# Patient Record
Sex: FEMALE | Race: ASIAN
[De-identification: names, ages, dates, MRNs, and addresses within clinical notes are randomized per-mention and may not be internally consistent; named-entity substitution may affect disease eponyms.]

---

## 2018-09-21 NOTE — GI INITIAL CONSULT NOTE
History of Present Illness


General


Date patient seen:  Sep 20, 2018


Time patient seen:  14:41


Referring physician:  ANETTE


Reason for Consultation:  SCERENING COLON





Present Illness


HPI


53 year old female patient presents today with c/o of diarrhea with BM x4 daily 

in which she describes as bristol stool type #6.  The patient denies any other 

associated GI symptoms; denies abdominal pain, N/V or diarrhea.  Patient with 

no history.  No history of endoscopy / colonoscopy .  Denies any unintentional 

weight loss or changes in dietary habits.  No signs of abuse or neglect.  

Patient is not fall risk.


Home Meds


Reported Medications


Atenolol* (TENORMIN*) 25 Mg Tablet, ORAL DAILY, TAB


   9/20/18


Med list reviewed/reconciled:  Yes


Allergies:  


Coded Allergies:  


     No Known Allergies (Unverified , 9/20/18)





Patient History


History Provided By:  Patient, Medical Record


PMH Narrative


HTN





Denies any surgical history


Family History Narrative


Mother with DM, heart disease.


Social History:  Reports: other - caffiene.; Denies: smoking, alcohol use, drug 

use





Review of Systems


All Other Systems:  negative except mentioned in HPI





Physical Exam





Vital Signs








  Date Time  Temp Pulse Resp B/P (MAP) Pulse Ox O2 Delivery O2 Flow Rate FiO2


 


9/20/18 14:00 98.7 98  146/72 96   





 98.7       








Sp02 EP Interpretation:  reviewed, normal


General Appearance:  well appearing, no apparent distress, alert


Head:  normocephalic


EENT:  PERRL/EOMI, normal ENT inspection


Neck:  supple


Respiratory:  normal breath sounds, no respiratory distress


Cardiovascular:  normal rate


Gastrointestinal:  normal inspection, non tender, soft, normal bowel sounds, non

-distended


Rectal:  deferred


Genitourinary:  no CVA tenderness


Musculoskeletal:  normal inspection, back normal


Neurologic:  normal inspection, alert, oriented x3, responsive


Psychiatric:  normal inspection, judgement/insight normal, memory normal


Skin:  normal inspection, normal color, no rash, warm/dry, palpation normal, 

well hydrated


Lymphatic:  normal inspection, no adenopathy





GI: Plan


Problems:  


(1) Diarrhea


(2) Colonoscopy planned


(3) HTN (hypertension)


Plan


Colonoscopy to be scheduled pending PA, will contact patient.


- CLD & (Nulytely/Suprep/Movi-Prep) prep instructions given and acknowledged by 

patient.


- NPO @ [place] day prior procedure explained.





Seen with Dr. Rascon.


Thank you for this patient referral.





The patient was seen and examined at bedside and all new and available data was 

reviewed in the patients chart. I agree with the above findings, impression 

and plan.  (Patient seen earlier today. Signature stamp does not reflect 

patient encounter time.). - MD Kasey De LeonBetsey-Jose JAI Sep 21, 2018 14:42

## 2018-10-22 ENCOUNTER — HOSPITAL ENCOUNTER (OUTPATIENT)
Dept: HOSPITAL 72 - GAS | Age: 53
Discharge: HOME | End: 2018-10-22
Payer: COMMERCIAL

## 2018-10-22 VITALS — DIASTOLIC BLOOD PRESSURE: 97 MMHG | SYSTOLIC BLOOD PRESSURE: 152 MMHG

## 2018-10-22 VITALS — DIASTOLIC BLOOD PRESSURE: 69 MMHG | SYSTOLIC BLOOD PRESSURE: 159 MMHG

## 2018-10-22 VITALS — SYSTOLIC BLOOD PRESSURE: 147 MMHG | DIASTOLIC BLOOD PRESSURE: 59 MMHG

## 2018-10-22 VITALS — DIASTOLIC BLOOD PRESSURE: 69 MMHG | SYSTOLIC BLOOD PRESSURE: 131 MMHG

## 2018-10-22 VITALS — SYSTOLIC BLOOD PRESSURE: 129 MMHG | DIASTOLIC BLOOD PRESSURE: 65 MMHG

## 2018-10-22 VITALS — DIASTOLIC BLOOD PRESSURE: 80 MMHG | SYSTOLIC BLOOD PRESSURE: 151 MMHG

## 2018-10-22 VITALS — BODY MASS INDEX: 28.52 KG/M2 | HEIGHT: 62 IN | WEIGHT: 155 LBS

## 2018-10-22 VITALS — DIASTOLIC BLOOD PRESSURE: 77 MMHG | SYSTOLIC BLOOD PRESSURE: 150 MMHG

## 2018-10-22 DIAGNOSIS — K64.8: ICD-10-CM

## 2018-10-22 DIAGNOSIS — K63.5: ICD-10-CM

## 2018-10-22 DIAGNOSIS — Z12.11: Primary | ICD-10-CM

## 2018-10-22 DIAGNOSIS — I10: ICD-10-CM

## 2018-10-22 PROCEDURE — 94003 VENT MGMT INPAT SUBQ DAY: CPT

## 2018-10-22 PROCEDURE — 45380 COLONOSCOPY AND BIOPSY: CPT

## 2018-10-22 PROCEDURE — 84703 CHORIONIC GONADOTROPIN ASSAY: CPT

## 2018-10-22 PROCEDURE — 94150 VITAL CAPACITY TEST: CPT

## 2018-10-22 PROCEDURE — 36415 COLL VENOUS BLD VENIPUNCTURE: CPT

## 2018-10-22 PROCEDURE — 93005 ELECTROCARDIOGRAM TRACING: CPT

## 2018-10-22 NOTE — ANETHESIA PREOPERATIVE EVAL
Anesthesia Pre-op PMH/ROS


General


Date of Evaluation:  Oct 22, 2018


Time of Evaluation:  08:48


Anesthesiologist:  los


ASA Score:  ASA 3


Mallampati Score


Class I : Soft palate, uvula, fauces, pillars visible


Class II: Soft palate, uvula, fauces visible


Class III: Soft palate, base of uvula visible


Class IV: Only hard plate visible


Mallampati Classification:  Class II


Surgeon:  chris


Diagnosis:  colon screening


Surgical Procedure:  colonoscopy


Anesthesia History:  none


Social History:  smoking - nonsmoker


Family History:  no anesthesia problems


Allergies:  


Coded Allergies:  


     No Known Allergies (Unverified , 10/22/18)


Medications:  see eMAR


Patient NPO?:  Yes





Past Medical History


Cardiovascular:  Reports: HTN





Anesthesia Pre-op Phys. Exam


Physician Exam





Last Vital Signs








  Date Time  Temp Pulse Resp B/P (MAP) Pulse Ox O2 Delivery O2 Flow Rate FiO2


 


10/22/18 08:31      Room Air  


 


10/22/18 08:23 97.8 76 18 147/59 97   





 97.8       








Constitutional:  NAD


Neurologic:  CN 2-12 intact


Cardiovascular:  RRR


Respiratory:  CTA


Gastrointestinal:  S/NT/ND





Airway Exam


Mallampati Score:  Class II


MO:  full


Neck:  supple


TMD:  2fb


ROM:  full


Dentures:  upper





Anesthesia Pre-op A/P


Labs





Labs








Test


  10/22/18


08:29


 


Human Chorionic Gonadotropin,


Qual Negative


(NEGATIVE)








Serum Pregnancy Test





Labs








Test


  10/22/18


08:29


 


Human Chorionic Gonadotropin,


Qual Negative


(NEGATIVE)











Risk Assessment & Plan


Assessment:


asa3


Plan:


mac


Status Change Before Surgery:  No





Pre-Antibiotics


Drug:  Claire Chacon MD Oct 22, 2018 08:59

## 2018-10-22 NOTE — PRE-PROCEDURE NOTE/ATTESTATION
Pre-Procedure Note/Attestation


Complete Prior to Procedure


Planned Procedure:  not applicable


Procedure Narrative:


colonoscopy





Indications for Procedure


Pre-Operative Diagnosis:


screening





Attestation


I attest that I discussed the nature of the procedure; its benefits; risks and 

complications; and alternatives (and the risks and benefits of such alternatives

), prior to the procedure, with the patient (or the patient's legal 

representative).





I attest that, if there was a reasonable possibility of needing a blood 

transfusion, the patient (or the patient's legal representative) was given the 

[facility] standardized written summary, pursuant 

to the Nicho Salome Blood Safety Act ([place] Health and Safety Code # 1645, as 

amended).





I attest that I re-evaluated the patient just prior to the surgery and that 

there has been no change in the patient's H&P, except as documented below:











Kirby Rascon MD Oct 22, 2018 09:58

## 2018-10-22 NOTE — SHORT STAY SURGERY H&P
History of Present Illness


History of Present Illness


Chief Complaint


see recent office note


HPI


Teresita Delos Reyes is a 53 year old female who was admitted on  for Screening





Patient History


Allergies:  


Coded Allergies:  


     No Known Allergies (Unverified , 10/22/18)





Medication History


Scheduled


Cholecalciferol (Vitamin D3)* (Vitamin D*), 1,000 UNIT ORAL DAILY, (Reported)


Losartan Potassium (Losartan Potassium), 12.5 MG PO DAILY, (Reported)





Discontinued Medications


Atenolol* (Tenormin*), Unknown Dose ORAL DAILY, (Reported)


   Discontinued Reason: Pt stopped taking med





Physical Exam


Vital Signs





Last Vital Signs








  Date Time  Temp Pulse Resp B/P (MAP) Pulse Ox O2 Delivery O2 Flow Rate FiO2


 


10/22/18 08:31      Room Air  


 


10/22/18 08:23 97.8 76 18 147/59 97   





 97.8       








Labs





Laboratory Tests








Test


  10/22/18


08:29


 


Human Chorionic Gonadotropin,


Qual Negative


(NEGATIVE)











Plan


Attestation


Are the patient's medical conditions optimized for surgery?











Kirby Rascon MD Oct 22, 2018 09:58

## 2018-10-22 NOTE — PROCEDURE NOTE
DATE OF PROCEDURE:  10/22/2018

SURGEON:  Kirby Rascon M.D.



ANESTHESIOLOGIST:  Dr. Painting.



PROCEDURE:  Colonoscopy with biopsy.



ANESTHESIA:  Per Dr. Painting.



INSTRUMENT:  Olympus adult flexible colonoscope.



INDICATIONS:  Screening colonoscopy evaluation.



The procedure, risks, benefits, and possible consequences, including

hemorrhage, aspiration, perforation and infection, and alternative

treatments, were explained to the patient/legal guardian by Dr. Kirby Rascon and the patient/legal guardian understood and accepted these

risks.



DESCRIPTION OF PROCEDURE:  After informed consent was obtained and the

patient was adequately sedated, first rectal exam was performed, which was

positive for internal hemorrhoids.  Then the scope was advanced from the

rectum into the cecum and subsequently to terminal ileum.  Quality of prep

was very good.



The patient had two diminutive polyps in the sigmoid area which were

removed with the cold biopsy forceps technique.  The rest of the colonic

examination grossly looked within normal limits.  Retroflexion of rectum

showed evidence of medium-sized nonbleeding internal hemorrhoids.



SUMMARY OF FINDINGS:

1. Two diminutive polyps removed, see above for details.

2. Internal hemorrhoids.



RECOMMENDATIONS:

1. Follow up biopsy results.

2. We recommend repeat colonoscopy in 5 years.









  ______________________________________________

  Kirby Rascon M.D.





DR:  Ju

D:  10/22/2018 10:19

T:  10/22/2018 15:12

JOB#:  [id number]

CC:

## 2018-10-22 NOTE — IMMEDIATE POST-OP EVALUATION
Immediate Post-Op Evalulation


Immediate Post-Op Evalulation


Procedure:  colonoscopy w/ bx


Date of Evaluation:  Oct 22, 2018


Time of Evaluation:  10:32


IV Fluids:  200ml 0.9ns


Blood Products:  none


Estimated Blood Loss:  negligible


Blood Pressure Systolic:  150


Blood Pressure Diastolic:  77


Pulse Rate:  66


Respiratory Rate:  18


O2 Sat by Pulse Oximetry:  100


Temperature (Fahrenheit):  97.0


Pain Score (1-10):  0


Nausea:  No


Vomiting:  No


Complications


none


Patient Status:  awake, reacts, patent


Hydration Status:  adequate


Drug:  Claire Chacon MD Oct 22, 2018 10:36

## 2018-10-22 NOTE — 48 HOUR POST ANESTHESIA EVAL
Post Anesthesia Evaluation


Procedure:  colonoscopy w/ bx


Date of Evaluation:  Oct 22, 2018


Time of Evaluation:  10:36


Blood Pressure Systolic:  144


0:  88


Pulse Rate:  60


Respiratory Rate:  18


Temperature (Fahrenheit):  97.0


O2 Sat by Pulse Oximetry:  100


Airway:  patent


Nausea:  No


Vomiting:  No


Pain Intensity:  0


Hydration Status:  adequate


Cardiopulmonary Status:


stable


Mental Status/LOC:  patient returned to baseline


Post-Anesthesia Complications:


none


Follow-up care needed:  N/A











Claire Reynoso MD Oct 22, 2018 10:37

## 2018-10-22 NOTE — ENDOSCOPY PROCEDURE NOTE
Endoscopy Procedure Note


General


Indication for Procedure:  screening


Procedures Performed:  colonoscopy


Operative Findings/Diagnosis:  2 polyps


Specimen:  yes


Pt Tolerated Procedure Well:  Yes


Estimated Blood Loss:  none





Anesthesia


Anesthesiologist:  zuri


Anesthesia:  MAC





Inserted Devices


Implant(s) used?:  No





Quality


Quality of Bowel Preparation:  Excellent


Did scope reach the cecum?:  Yes


Was there any complications?:  No





GI Core Measures


50 yrs or older w/o bx or poly:  No


10yrs. F/U not recommended:  Yes


If not recommended, why?:  Above average risk


10 yrs. F/U needed:  Yes


18 years or older w/prev. colo:  No











Kirby Rascon MD Oct 22, 2018 10:20

## 2018-10-24 NOTE — CARDIOLOGY REPORT
--------------- APPROVED REPORT --------------





EKG Measurement

Heart [id number]

SC [id number]

[id number]

[id number]

[id number]





Normal sinus rhythm

Nonspecific T wave abnormality

Abnormal ECG

## 2018-11-20 ENCOUNTER — HOSPITAL ENCOUNTER (OUTPATIENT)
Dept: HOSPITAL 72 - PAN | Age: 53
Discharge: HOME | End: 2018-11-20
Payer: COMMERCIAL

## 2018-11-20 VITALS — SYSTOLIC BLOOD PRESSURE: 131 MMHG | DIASTOLIC BLOOD PRESSURE: 68 MMHG

## 2018-11-20 DIAGNOSIS — I10: Primary | ICD-10-CM

## 2018-11-20 DIAGNOSIS — R19.7: ICD-10-CM

## 2018-11-20 DIAGNOSIS — K64.8: ICD-10-CM

## 2018-11-20 PROCEDURE — 99212 OFFICE O/P EST SF 10 MIN: CPT

## 2018-11-20 NOTE — GI PROGRESS NOTE
Assessment/Plan


Problems:  


(1) HTN (hypertension)


ICD Codes:  I10 - Essential (primary) hypertension


SNOMED:  58327809


(2) Diarrhea


ICD Codes:  R19.7 - Diarrhea, unspecified


SNOMED:  21479635


Status:  stable


Status Narrative


Seen with Dr. Rascon.


Assessment/Plan


SUMMARY OF FINDINGS reviewed with patient:


1. Two diminutive polyps removed, see above for details.


2. Internal hemorrhoids.





RECOMMENDATIONS:


1. Follow up biopsy results. >> benign colonic mucosa


2. We recommend repeat colonoscopy in 5 years.


RTC prn





The patient was seen and examined at bedside and all new and available data was 

reviewed in the patients chart. I agree with the above findings, impression 

and plan.  (Patient seen earlier today. Signature stamp does not reflect 

patient encounter time.). - Kirby Rascon MD





Subjective


Gastrointestinal/Abdominal:  Reports: no symptoms





Objective





Last 24 Hour Vital Signs








  Date Time  Temp Pulse Resp B/P (MAP) Pulse Ox O2 Delivery O2 Flow Rate FiO2


 


11/20/18 09:10 98.2 68 16 131/68 98   








General Appearance:  WD/WN, no apparent distress, alert


Cardiovascular:  normal rate


Respiratory/Chest:  normal breath sounds, no respiratory distress


Abdominal Exam:  normal bowel sounds, non tender, soft


Extremities:  normal range of motion, non-tender











Damian Parks NP Nov 20, 2018 16:25